# Patient Record
Sex: FEMALE | Race: WHITE | Employment: UNEMPLOYED | RURAL
[De-identification: names, ages, dates, MRNs, and addresses within clinical notes are randomized per-mention and may not be internally consistent; named-entity substitution may affect disease eponyms.]

---

## 2019-06-20 ENCOUNTER — OFFICE VISIT (OUTPATIENT)
Dept: FAMILY MEDICINE CLINIC | Age: 59
End: 2019-06-20

## 2019-06-20 VITALS
WEIGHT: 179 LBS | DIASTOLIC BLOOD PRESSURE: 74 MMHG | OXYGEN SATURATION: 96 % | TEMPERATURE: 98 F | HEIGHT: 68 IN | BODY MASS INDEX: 27.13 KG/M2 | RESPIRATION RATE: 20 BRPM | HEART RATE: 103 BPM | SYSTOLIC BLOOD PRESSURE: 124 MMHG

## 2019-06-20 DIAGNOSIS — Z78.0 POST-MENOPAUSE: ICD-10-CM

## 2019-06-20 DIAGNOSIS — Z92.21 S/P CHEMOTHERAPY, TIME SINCE GREATER THAN 12 WEEKS: ICD-10-CM

## 2019-06-20 DIAGNOSIS — R53.81 MALAISE AND FATIGUE: Primary | ICD-10-CM

## 2019-06-20 DIAGNOSIS — E55.9 VITAMIN D DEFICIENCY: ICD-10-CM

## 2019-06-20 DIAGNOSIS — Z13.220 SCREENING FOR HYPERLIPIDEMIA: ICD-10-CM

## 2019-06-20 DIAGNOSIS — Z13.820 SCREENING FOR OSTEOPOROSIS: ICD-10-CM

## 2019-06-20 DIAGNOSIS — R53.83 MALAISE AND FATIGUE: Primary | ICD-10-CM

## 2019-06-20 DIAGNOSIS — Z76.89 ENCOUNTER TO ESTABLISH CARE WITH NEW DOCTOR: ICD-10-CM

## 2019-06-20 PROBLEM — E78.00 HYPERCHOLESTEREMIA: Status: ACTIVE | Noted: 2018-09-20

## 2019-06-20 PROBLEM — E78.01 FAMILIAL HYPERCHOLESTEROLEMIA: Status: ACTIVE | Noted: 2017-08-31

## 2019-06-20 PROBLEM — C50.811 MALIGNANT NEOPLASM OF OVERLAPPING SITES OF RIGHT BREAST IN FEMALE, ESTROGEN RECEPTOR NEGATIVE (HCC): Status: ACTIVE | Noted: 2018-09-28

## 2019-06-20 PROBLEM — J30.89 PERENNIAL ALLERGIC RHINITIS: Status: ACTIVE | Noted: 2017-07-26

## 2019-06-20 PROBLEM — Z17.1 MALIGNANT NEOPLASM OF OVERLAPPING SITES OF RIGHT BREAST IN FEMALE, ESTROGEN RECEPTOR NEGATIVE (HCC): Status: ACTIVE | Noted: 2018-09-28

## 2019-06-20 PROBLEM — R73.03 PREDIABETES: Status: ACTIVE | Noted: 2017-08-31

## 2019-06-20 PROBLEM — Z80.8 FAMILY HISTORY OF SKIN CANCER: Status: ACTIVE | Noted: 2017-05-15

## 2019-06-20 PROBLEM — J30.81 ALLERGIC RHINITIS DUE TO ANIMAL HAIR AND DANDER: Status: ACTIVE | Noted: 2017-07-26

## 2019-06-20 PROBLEM — E03.8 SUBCLINICAL HYPOTHYROIDISM: Status: ACTIVE | Noted: 2017-08-31

## 2019-06-20 PROBLEM — I10 ESSENTIAL HYPERTENSION: Status: ACTIVE | Noted: 2018-09-20

## 2019-06-20 PROBLEM — K31.89 DUODENAL MASS: Status: ACTIVE | Noted: 2017-05-15

## 2019-06-20 PROBLEM — Z80.0 FAMILY HISTORY OF COLON CANCER: Status: ACTIVE | Noted: 2017-05-15

## 2019-06-20 PROBLEM — K21.9 GASTROESOPHAGEAL REFLUX DISEASE: Status: ACTIVE | Noted: 2017-11-07

## 2019-06-20 RX ORDER — MONTELUKAST SODIUM 10 MG/1
10 TABLET ORAL DAILY
COMMUNITY
End: 2019-12-05 | Stop reason: ALTCHOICE

## 2019-06-20 RX ORDER — SPIRONOLACTONE 50 MG/1
50 TABLET, FILM COATED ORAL DAILY
Qty: 30 TAB | Refills: 11 | Status: SHIPPED | OUTPATIENT
Start: 2019-06-20 | End: 2019-07-20

## 2019-06-20 RX ORDER — MULTIVITAMIN
TABLET ORAL
COMMUNITY

## 2019-06-20 RX ORDER — SPIRONOLACTONE 50 MG/1
TABLET, FILM COATED ORAL DAILY
COMMUNITY
End: 2019-06-20 | Stop reason: SDUPTHER

## 2019-06-20 RX ORDER — AZELASTINE 1 MG/ML
1 SPRAY, METERED NASAL 2 TIMES DAILY
COMMUNITY
End: 2019-06-20 | Stop reason: SDUPTHER

## 2019-06-20 RX ORDER — FLUTICASONE PROPIONATE 50 MCG
1 SPRAY, SUSPENSION (ML) NASAL DAILY
COMMUNITY

## 2019-06-20 RX ORDER — AZELASTINE 1 MG/ML
1 SPRAY, METERED NASAL 2 TIMES DAILY
Qty: 1 BOTTLE | Refills: 11 | Status: SHIPPED | OUTPATIENT
Start: 2019-06-20 | End: 2019-12-05 | Stop reason: ALTCHOICE

## 2019-06-20 RX ORDER — DEXLANSOPRAZOLE 30 MG/1
CAPSULE, DELAYED RELEASE ORAL DAILY
COMMUNITY
End: 2019-12-05 | Stop reason: ALTCHOICE

## 2019-06-20 NOTE — PROGRESS NOTES
Chief Complaint:  Chief Complaint   Patient presents with    New Patient     Establish new PCP      HISTORY OF PRESENT ILLNESS:   59F who is new to [facility]. She is here to establish care. Recently moved from [place] 3-weeks ago to [place].  is retiring here. She is a retired nurse. She was diagnosed with triple negative R-breast cancer in 2018. She has completed chemo, radiation and lumpectomy. She has already scheduled to see a breast specialist for continuity of care - Dr. Stephanie Dumont - through [address] [place] [address]. Her appointment is July 10, 2019. She will need to return for fasting labs. She has otherwise been doing well and no other major medical problems. 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    Essential hypertension I10    Familial hypercholesterolemia E78.01    Family history of colon cancer Z80.0    Family history of skin cancer Z80.8    Gastroesophageal reflux disease K21.9    Hypercholesteremia E78.00    Malignant neoplasm of overlapping sites of right breast in female, estrogen receptor negative ([facility] [address] C50.811, Z17.1    Perennial allergic rhinitis J30.89    Prediabetes R73.03    Subclinical hypothyroidism E03.9       PAST SURGICAL HISTORY:  Past Surgical History:   Procedure Laterality Date    BREAST SURGERY PROCEDURE UNLISTED      HX APPENDECTOMY      HX ORTHOPAEDIC         MEDICATIONS:  Current Outpatient Medications   Medication Sig Dispense Refill    dexlansoprazole (DEXILANT) 30 mg capsule Take  by mouth daily.  azelastine (ASTELIN) 137 mcg (0.1 %) nasal spray 1 Spray two (2) times a day. Use in each nostril as directed      fluticasone propionate (FLONASE ALLERGY RELIEF) 50 mcg/actuation nasal spray 1 [place] by Both Nostrils route daily.  montelukast (SINGULAIR) 10 mg tablet Take 10 mg by mouth daily.       spironolactone (ALDACTONE) 50 mg tablet Take  by mouth daily. ALLERGIES:  Allergies   Allergen Reactions    Tape [Adhesive] Rash        SOCIAL HISTORY:   59F . She 2-adult children. Recently moved from [place] 3-weeks ago to [place].  is retiring here. She is a retired nurse. Trained and worked in [employer] and [place]. Originally from [place]. Lived in [place] 3 years and prior to that [place]. She was diagnosed with triple negative breast cancer in September 2018. She has completed chemo, radiation and lumpectomy. Genetic cancer was negative. She will be following up with Dr. Marie Carrillo at [facility]. Her appointment will be July 10, 2019. She smoked in her youth. Stopped 25 years ago. Drinks wine 3-4 x per week. FAMILY HISTORY:   Family History   Problem Relation Age of Onset    Cancer (Colon, uterine) Mother     Hypertension Mother     Diabetes Mother     Stroke Mother     Elevated Lipids Mother     Cancer (Epital (?) cancer of the ear). Father     Stroke Father     Heart Disease Father        REVIEW OF SYSTEMS:  Review of Systems - Negative except for documented in the HPI. PHYSICAL EXAMINATION:  /74 (BP 1 Location: Left arm, BP Patient Position: Sitting)   Pulse (!) 103   Temp 98 °F (36.7 °C) (Oral)   Resp 20   Ht 5' 8\" (1.727 m)   Wt 179 lb (81.2 kg)   SpO2 96%   BMI 27.22 kg/m²     General:  Alert, cooperative, no distress. Head:  Normocephalic, without obvious abnormality, atraumatic. Eyes:  Conjunctivae/corneas clear. Pupils equal, round, reactive to light. Extraocular movements intact. Lungs:   Clear to auscultation bilaterally. Chest wall:  No tenderness or deformity. Heart:  Regular rate and rhythm, S1, S2 normal, no murmur, click, rub, or gallop. Abdomen:   Soft, non-tender. Bowel sounds normal. No masses. No organomegaly. Extremities: Extremities normal, atraumatic, no cyanosis or edema. Pulses: 2+ and symmetric all extremities.    Skin: Skin color, texture, turgor normal. No rashes or lesions. Lymph nodes: Cervical, supraclavicular, and axillary nodes normal.   Neurologic: CNII-XII intact. Normal strength, sensation, and reflexes throughout. LABORATORY DATA:  Pending/Ordered      IMPRESSION AND PLAN:   59F who is [facility]. She is here to establish care and  s/p treatment for right breast cancer with neoadjuvant AC-T, lumpectomy, SLN bx, and RT. Overall, she is recovering well with resolving fatigue and mild neuropathy. Her skin is healing well after RT. She is motivated to exercise and lose weight. Also, she recently moved from [place] to [place] and has made an appointment with oncology/breast - Dr. Jason Mckinney. She will return for pending fasting labs. Diagnoses and all orders for this visit:    1. Malaise and fatigue  -     CBC WITH AUTOMATED DIFF    2. Vitamin D deficiency  -     VITAMIN D, 25 HYDROXY    3. Screening for hyperlipidemia  -     METABOLIC PANEL, COMPREHENSIVE  -     LIPID PANEL    4. Encounter to establish care with new doctor  -     THYROID CASCADE PROFILE    5. Screening for osteoporosis  -     DEXA BONE DENSITY STUDY AXIAL; Future    6. S/P chemotherapy, time since greater than 12 weeks  -     DEXA BONE DENSITY STUDY AXIAL; Future    7. Post-menopause  -     DEXA BONE DENSITY STUDY AXIAL; Future    Other orders  -     azelastine (ASTELIN) 137 mcg (0.1 %) nasal spray; 1 [place] by Both Nostrils route two (2) times a day. Use in each nostril as directed  -     spironolactone (ALDACTONE) 50 mg tablet; Take 1 Tab by mouth daily for 30 days. I have discussed the diagnosis with the patient and the intended treatment plan as seen in the above orders. The patient has received an after-visit summary and questions were answered concerning future plans. Asked to return should symptoms worsen or not improve with treatment. Any pending labs and studies will be relayed to patient when they become available.      Pt verbalizes understanding of plan of care and denies further questions or concerns at this time. Follow-up and Dispositions    · Return if symptoms worsen or fail to improve.        Azar Ibanez MD

## 2019-06-28 ENCOUNTER — OFFICE VISIT (OUTPATIENT)
Dept: FAMILY MEDICINE CLINIC | Age: 59
End: 2019-06-28

## 2019-06-28 VITALS
TEMPERATURE: 98.3 F | SYSTOLIC BLOOD PRESSURE: 120 MMHG | WEIGHT: 186 LBS | HEIGHT: 68 IN | HEART RATE: 80 BPM | RESPIRATION RATE: 16 BRPM | OXYGEN SATURATION: 97 % | BODY MASS INDEX: 28.19 KG/M2 | DIASTOLIC BLOOD PRESSURE: 84 MMHG

## 2019-06-28 DIAGNOSIS — R00.2 PALPITATIONS: Primary | ICD-10-CM

## 2019-06-28 RX ORDER — MINERAL OIL
ENEMA (ML) RECTAL DAILY
COMMUNITY
End: 2019-12-05 | Stop reason: ALTCHOICE

## 2019-06-28 RX ORDER — ESTRADIOL 0.1 MG/G
2 CREAM VAGINAL DAILY
COMMUNITY

## 2019-06-28 NOTE — PROGRESS NOTES
HISTORY OF PRESENT ILLNESS  Nina Wylie is a 60 y.o. female. HPI   Pt presents with \"heart palpitations\"  Pt states that 3 days ago, 6/25, she felt like her heart was skipping beats. The next day, she noted that her heart felt like it was racing, and also skipping beats. This sensation has continued. She does not have chest pain, but feels like she can't get a deep breath  No shortness of breath  No edema  This has never occurred before  No recent change in medications    Of note, patient has gone through chemo and radiation, and is wondering if this could correlate? They also recently moved, and she is wondering if she is possibly just overdoing it? Review of Systems   Constitutional: Negative for fever. HENT: Negative for congestion. Cardiovascular: Positive for palpitations. Physical Exam   Constitutional: She is oriented to person, place, and time. She appears well-developed and well-nourished. HENT:   Head: Normocephalic and atraumatic. Cardiovascular: Normal rate, regular rhythm and normal heart sounds. Pulmonary/Chest: Effort normal and breath sounds normal.   Neurological: She is alert and oriented to person, place, and time. Skin: Skin is warm and dry. Psychiatric: She has a normal mood and affect. Her behavior is normal.       ASSESSMENT and PLAN    ICD-10-CM ICD-9-CM    1. Palpitations R00.2 785.1 AMB POC EKG ROUTINE W/ 12 LEADS, INTER & REP      REFERRAL TO CARDIOLOGY     Will send to cardiology for full eval, based on symptoms and PAC's  Educated about calling for appointment today  Educated about ALWAYS calling 911 or going to the ER with ANY shortness of breath, chest pain, etc.    Pt informed to return to office with worsening of symptoms, or PRN with any questions or concerns. Pt verbalizes understanding of plan of care and denies further questions or concerns at this time.

## 2019-06-28 NOTE — PATIENT INSTRUCTIONS
Palpitations: Care Instructions  Your Care Instructions    Heart palpitations are the uncomfortable sensation that your heart is beating fast or irregularly. You might feel pounding or fluttering in your chest. It might feel like your heart is skipping a beat. Although palpitations may be caused by a heart problem, they also occur because of stress, fatigue, or use of alcohol, caffeine, or nicotine. Many medicines, including diet pills, antihistamines, decongestants, and some herbal products, can cause heart palpitations. Nearly everyone has palpitations from time to time. Depending on your symptoms, your doctor may need to do more tests to try to find the cause of your palpitations. Follow-up care is a key part of your treatment and safety. Be sure to make and go to all appointments, and call your doctor if you are having problems. It's also a good idea to know your test results and keep a list of the medicines you take. How can you care for yourself at home? · Avoid caffeine, nicotine, and excess alcohol. · Do not take illegal drugs, such as methamphetamines and cocaine. · Do not take weight loss or diet medicines unless you talk with your doctor first.  · Get plenty of sleep. · Do not overeat. · If you have palpitations again, take deep breaths and try to relax. This may slow a racing heart. · If you start to feel lightheaded, lie down to avoid injuries that might result if you pass out and fall down. · Keep a record of your palpitations and bring it to your next doctor's appointment. Write down:  ? The date and time. ? Your pulse. (If your heart is beating fast, it may be hard to count your pulse.)  ? What you were doing when the palpitations started. ? How long the palpitations lasted. ? Any other symptoms. · If an activity causes palpitations, slow down or stop. Talk to your doctor before you do that activity again. · Take your medicines exactly as prescribed.  Call your doctor if you think you are having a problem with your medicine. When should you call for help? Call 911 anytime you think you may need emergency care. For example, call if:    · You passed out (lost consciousness).     · You have symptoms of a heart attack. These may include:  ? Chest pain or pressure, or a strange feeling in the chest.  ? Sweating. ? Shortness of breath. ? Pain, pressure, or a strange feeling in the back, neck, jaw, or upper belly or in one or both shoulders or arms. ? Lightheadedness or sudden weakness. ? A fast or irregular heartbeat. After you call 911, the  may tell you to chew 1 adult-strength or 2 to 4 low-dose aspirin. Wait for an ambulance. Do not try to drive yourself.     · You have symptoms of a stroke. These may include:  ? Sudden numbness, tingling, weakness, or loss of movement in your face, arm, or leg, especially on only one side of your body. ? Sudden vision changes. ? Sudden trouble speaking. ? Sudden confusion or trouble understanding simple statements. ? Sudden problems with walking or balance. ? A sudden, severe headache that is different from past headaches.    Call your doctor now or seek immediate medical care if:    · You have heart palpitations and:  ? Are dizzy or lightheaded, or you feel like you may faint. ? Have new or increased shortness of breath.    Watch closely for changes in your health, and be sure to contact your doctor if:    · You continue to have heart palpitations. Where can you learn more? Go to http://rema-elias.info/. Enter R508 in the search box to learn more about \"Palpitations: Care Instructions. \"  Current as of: July 22, 2018  Content Version: 11.9  © 5362-3895 [employer]. Care instructions adapted under license by [employer] (which disclaims liability or warranty for this information).  If you have questions about a medical condition or this instruction, always ask your healthcare professional. [employer] disclaims any warranty or liability for your use of this information.

## 2019-06-29 LAB
25(OH)D3+25(OH)D2 SERPL-MCNC: 29.5 NG/ML (ref 30–100)
ALBUMIN SERPL-MCNC: 4.3 G/DL (ref 3.6–4.8)
ALBUMIN/GLOB SERPL: 1.7 {RATIO} (ref 1.2–2.2)
ALP SERPL-CCNC: 82 IU/L (ref 39–117)
ALT SERPL-CCNC: 27 IU/L (ref 0–32)
AST SERPL-CCNC: 27 IU/L (ref 0–40)
BASOPHILS # BLD AUTO: 0 X10E3/UL (ref 0–0.2)
BASOPHILS NFR BLD AUTO: 0 %
BILIRUB SERPL-MCNC: 0.4 MG/DL (ref 0–1.2)
BUN SERPL-MCNC: 11 MG/DL (ref 8–27)
BUN/CREAT SERPL: 14 (ref 12–28)
CALCIUM SERPL-MCNC: 9.3 MG/DL (ref 8.7–10.3)
CHLORIDE SERPL-SCNC: 106 MMOL/L (ref 96–106)
CHOLEST SERPL-MCNC: 212 MG/DL (ref 100–199)
CO2 SERPL-SCNC: 27 MMOL/L (ref 20–29)
CREAT SERPL-MCNC: 0.8 MG/DL (ref 0.57–1)
EOSINOPHIL # BLD AUTO: 0.1 X10E3/UL (ref 0–0.4)
EOSINOPHIL NFR BLD AUTO: 2 %
ERYTHROCYTE [DISTWIDTH] IN BLOOD BY AUTOMATED COUNT: 14.5 % (ref 12.3–15.4)
GLOBULIN SER CALC-MCNC: 2.6 G/DL (ref 1.5–4.5)
GLUCOSE SERPL-MCNC: 117 MG/DL (ref 65–99)
HCT VFR BLD AUTO: 43 % (ref 34–46.6)
HDLC SERPL-MCNC: 47 MG/DL
HGB BLD-MCNC: 13.9 G/DL (ref 11.1–15.9)
IMM GRANULOCYTES # BLD AUTO: 0 X10E3/UL (ref 0–0.1)
IMM GRANULOCYTES NFR BLD AUTO: 0 %
INTERPRETATION, 910389: NORMAL
INTERPRETIVE COMMENT, 330017: NORMAL
LDLC SERPL CALC-MCNC: 138 MG/DL (ref 0–99)
LYMPHOCYTES # BLD AUTO: 1.5 X10E3/UL (ref 0.7–3.1)
LYMPHOCYTES NFR BLD AUTO: 31 %
MCH RBC QN AUTO: 29.9 PG (ref 26.6–33)
MCHC RBC AUTO-ENTMCNC: 32.3 G/DL (ref 31.5–35.7)
MCV RBC AUTO: 93 FL (ref 79–97)
MONOCYTES # BLD AUTO: 0.4 X10E3/UL (ref 0.1–0.9)
MONOCYTES NFR BLD AUTO: 7 %
NEUTROPHILS # BLD AUTO: 2.9 X10E3/UL (ref 1.4–7)
NEUTROPHILS NFR BLD AUTO: 60 %
PLATELET # BLD AUTO: 223 X10E3/UL (ref 150–450)
POTASSIUM SERPL-SCNC: 4.5 MMOL/L (ref 3.5–5.2)
PROT SERPL-MCNC: 6.9 G/DL (ref 6–8.5)
RBC # BLD AUTO: 4.65 X10E6/UL (ref 3.77–5.28)
SODIUM SERPL-SCNC: 144 MMOL/L (ref 134–144)
T4 FREE SERPL-MCNC: 1.06 NG/DL (ref 0.82–1.77)
THYROPEROXIDASE AB SERPL-ACNC: 14 IU/ML (ref 0–34)
TRIGL SERPL-MCNC: 134 MG/DL (ref 0–149)
TSH SERPL DL<=0.005 MIU/L-ACNC: 4.59 UIU/ML (ref 0.45–4.5)
VLDLC SERPL CALC-MCNC: 27 MG/DL (ref 5–40)
WBC # BLD AUTO: 4.9 X10E3/UL (ref 3.4–10.8)

## 2019-07-05 ENCOUNTER — OFFICE VISIT (OUTPATIENT)
Dept: CARDIOLOGY CLINIC | Age: 59
End: 2019-07-05

## 2019-07-05 VITALS
SYSTOLIC BLOOD PRESSURE: 128 MMHG | HEIGHT: 68 IN | BODY MASS INDEX: 27.65 KG/M2 | RESPIRATION RATE: 16 BRPM | HEART RATE: 95 BPM | DIASTOLIC BLOOD PRESSURE: 82 MMHG | WEIGHT: 182.4 LBS | OXYGEN SATURATION: 96 %

## 2019-07-05 DIAGNOSIS — R00.2 PALPITATIONS: Primary | ICD-10-CM

## 2019-07-05 DIAGNOSIS — Z85.3 HISTORY OF BREAST CANCER: ICD-10-CM

## 2019-07-05 NOTE — PROGRESS NOTES
Krishna Mendez is a 60 y.o. female    Chief Complaint   Patient presents with    New Patient     Ref by Dr. Susan Oliveros Palpitations       Chest pain No  SOB Pt states having SOB with exertion. Dizziness Pt states she gets dizzy randomly and it sometimes happens when she turns too fast.   Swelling NO  Recent hospital visit NO  Refills NO    Visit Vitals  /82 (BP 1 Location: Left arm, BP Patient Position: Sitting)   Pulse 95   Resp 16   Ht 5' 8\" (1.727 m)   Wt 182 lb 6.4 oz (82.7 kg)   SpO2 96%   BMI 27.73 kg/m²       1. Have you been to the ER, urgent care clinic since your last visit? Hospitalized since your last visit? NO    2. Have you seen or consulted any other health care providers outside of the [address] [place] [address] since your last visit? Include any pap smears or colon screening.   NO

## 2019-07-05 NOTE — PROGRESS NOTES
Christina Iniguez MD    [id number] Merged with [facility] [place], [address] [facility]    Office [phone],M [phone]  Pager [phone] Nitin Escobar is a 60 y.o. female referred for evaluation of palpitations. Consult requested by Lisa Arvizu MD      Primary care physician:  Lisa Arvizu MD      Chief complaint:  Mary Lou Mitchell is a 60 y.o. female who complains of   Chief Complaint   Patient presents with   [facility] Patient     Ref by Dr. Purnima Juarez     Dear Ms Merlyn Thorne,    I had the pleasure of seeing Ms Mary Lou Mitchell in the office today. Assessment:  Palpitations  Breast CA - Right ( s/p lumpectomy/radiation/chemo)        Plan:     ECHO  E cardio event monitor. Follow-up after cardiac work-up. Aggressive cardiovascular risk factor modification. Patient understands the plan. All questions were answered to the patient's satisfaction. Medication Side Effects and Warnings were discussed with patient: yes  Patient Labs were reviewed and or requested:  yes  Patient Past Records were reviewed and or requested: yes    I appreciate the opportunity to be involved in Ms. Mckeon. Please see note below for details. Please do not hesitate to contact us with questions or concerns. Christina Iniguez MD    Cardiac Testing/ Procedures: A. Cardiac Cath/PCI:    B.ECHO/DRE: 10/24/18 - EF 55%; Mild LVH; Asc aorta midly dilated 4.1 cm    C. StressNuclear/Stress ECHO/Stress test:    D.Vascular:    E. EP:    F. Miscellaneous: 10/4/18 CT chest/abd/pelvis -performed for evaluation of breast cancer. No mention of the ureter but states that no pleural/pericardial effusions, heart size normal.    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 History of triple negative R-breast cancer in 2018.  She has completed chemo, radiation and lumpectomy followed by Dr. Bianca Camarena - through [address] [place] [address]. History of intermittent palpitations in the last 1 to 2 weeks. Occasional dizziness. No syncope. Blood pressure was not low. Heart rate in the 100s to 120s with irregularity. No history of chest pain/dyspnea/diaphoresis/swelling lower extremities    Originally from [facility]          Past Medical History:   Diagnosis Date    Hypertension       Past Surgical History:   Procedure Laterality Date    BREAST SURGERY PROCEDURE UNLISTED      HX APPENDECTOMY      HX ORTHOPAEDIC       Family History   Problem Relation Age of Onset    Cancer Mother     Hypertension Mother     Diabetes Mother     Stroke Mother     Elevated Lipids Mother     Cancer Father     Stroke Father     Heart Disease Father       Social History     Tobacco Use    Smoking status: Former Smoker    Smokeless tobacco: Never Used   Substance Use Topics    Alcohol use: Yes     Alcohol/week: 2.4 oz     Types: 4 Glasses of wine per week    Drug use: Not Currently     Types: Marijuana     Comment: Used x 1 during chemo          Medications before admission:    Current Outpatient Medications   Medication Sig Dispense    estradiol (ESTRACE) 0.01 % (0.1 mg/gram) vaginal cream Insert 2 g into vagina daily. Use 1/2 gram once a week     fexofenadine (ALLEGRA) 180 mg tablet Take  by mouth daily.  dexlansoprazole (DEXILANT) 30 mg capsule Take  by mouth daily.  fluticasone propionate (FLONASE ALLERGY RELIEF) 50 mcg/actuation nasal spray 1 [place] by Both Nostrils route daily.  montelukast (SINGULAIR) 10 mg tablet Take 10 mg by mouth daily.  azelastine (ASTELIN) 137 mcg (0.1 %) nasal spray 1 [place] by Both Nostrils route two (2) times a day. Use in each nostril as directed 1 Bottle    spironolactone (ALDACTONE) 50 mg tablet Take 1 Tab by mouth daily for 30 days. 30 Tab    calcium-cholecalciferol, D3, (CALTRATE 600+D) tablet Take  by mouth. No current facility-administered medications for this visit. Review of Systems:  (bold if positive, if negative)    Gen:  Eyes:  ENT:  CVS: Pulm:  GI:  Abdominal pain,  :    MS:  Skin:  Psych:  Endo:    Hem:  Renal:    Neuro:        Physical Exam:  Visit Vitals  /82 (BP 1 Location: Left arm, BP Patient Position: Sitting)   Pulse 95   Resp 16   Ht 5' 8\" (1.727 m)   Wt 182 lb 6.4 oz (82.7 kg)   SpO2 96%   BMI 27.73 kg/m²          Gen: Well-developed, well-nourished, in no acute distress  HEENT:  Pink conjunctivae, hearing intact to voice, moist mucous membranes  Neck: Supple,No JVD, No Carotid Bruit, Thyroid- non tender  Resp: No accessory muscle use, Clear breath sounds, No rales or rhonchi  Card: Regular Rate,Rythm,Normal S1, S2, No murmurs, rubs or gallop. No thrills. Abd:  Soft, non-tender, non-distended, normoactive bowel sounds are present,   MSK: No cyanosis or clubbing  Skin: No rashes or ulcers  Neuro: moving all four extremities, no focal deficit, follows commands appropriately  Psych:  Good insight, oriented to person, place and time, alert, Nml Affect  LE: No edema  Vascular: Radial pulses 2+ and symmetric        EKG: Sinus rhythm RSR'-V1      Cxray:    LABS:    No results for input(s): CPK, TROIQ in the last 72 hours.     No lab exists for component: CKQMB, CPKMB    Lab Results   Component Value Date/Time    WBC 4.9 06/28/2019 08:21 AM    HGB 13.9 06/28/2019 08:21 AM    HCT 43.0 06/28/2019 08:21 AM    PLATELET 177 68/40/2577 08:21 AM    MCV 93 06/28/2019 08:21 AM     Lab Results   Component Value Date/Time    Sodium 144 06/28/2019 08:21 AM    Potassium 4.5 06/28/2019 08:21 AM    Chloride 106 06/28/2019 08:21 AM    CO2 27 06/28/2019 08:21 AM    Glucose 117 (H) 06/28/2019 08:21 AM    BUN 11 06/28/2019 08:21 AM    Creatinine 0.80 06/28/2019 08:21 AM    BUN/Creatinine ratio 14 06/28/2019 08:21 AM    GFR est AA 93 06/28/2019 08:21 AM    GFR est non-AA 80 06/28/2019 08:21 AM    Calcium 9.3 06/28/2019 08:21 AM             Abdelrahman Vargas MD

## 2019-07-11 ENCOUNTER — TELEPHONE (OUTPATIENT)
Dept: CARDIOLOGY CLINIC | Age: 59
End: 2019-07-11

## 2019-07-11 NOTE — TELEPHONE ENCOUNTER
Patient needs an E cardio event monitor per Dr Zulema Cho office visit notes on 7/5/19 for Dx Palpitations.

## 2019-07-12 ENCOUNTER — CLINICAL SUPPORT (OUTPATIENT)
Dept: CARDIOLOGY CLINIC | Age: 59
End: 2019-07-12

## 2019-07-12 DIAGNOSIS — R00.2 PALPITATIONS: ICD-10-CM

## 2019-07-15 ENCOUNTER — TELEPHONE (OUTPATIENT)
Dept: CARDIOLOGY CLINIC | Age: 59
End: 2019-07-15

## 2019-07-15 ENCOUNTER — TELEPHONE (OUTPATIENT)
Dept: FAMILY MEDICINE CLINIC | Age: 59
End: 2019-07-15

## 2019-07-15 NOTE — TELEPHONE ENCOUNTER
Spoke with patient who states she has several tests scheduled by the cardiologist coming up soon. Advised patient to continue to follow up with Cardiology and unless there is an acute problem to schedule a follow up appointment in 3-4 months. Voiced agreement and appreciation for the call.

## 2019-07-23 ENCOUNTER — TELEPHONE (OUTPATIENT)
Dept: CARDIOLOGY CLINIC | Age: 59
End: 2019-07-23

## 2019-07-23 NOTE — TELEPHONE ENCOUNTER
Called patient reviewed below results verbalized understanding.      ----- Message from Margit Lombard, MD sent at 7/23/2019  5:23 PM EDT -----  ECHO - Nml EF/pump function.  Valves normal

## 2019-08-05 ENCOUNTER — HOSPITAL ENCOUNTER (OUTPATIENT)
Dept: MAMMOGRAPHY | Age: 59
Discharge: HOME | End: 2019-08-05
Attending: INTERNAL MEDICINE
Payer: COMMERCIAL

## 2019-08-05 DIAGNOSIS — Z92.21 S/P CHEMOTHERAPY, TIME SINCE GREATER THAN 12 WEEKS: ICD-10-CM

## 2019-08-05 DIAGNOSIS — Z13.820 SCREENING FOR OSTEOPOROSIS: ICD-10-CM

## 2019-08-05 DIAGNOSIS — Z78.0 POST-MENOPAUSE: ICD-10-CM

## 2019-08-05 PROCEDURE — 77080 DXA BONE DENSITY AXIAL: CPT

## 2019-09-12 ENCOUNTER — OFFICE VISIT (OUTPATIENT)
Dept: CARDIOLOGY CLINIC | Age: 59
End: 2019-09-12

## 2019-09-12 VITALS
BODY MASS INDEX: 27.1 KG/M2 | DIASTOLIC BLOOD PRESSURE: 82 MMHG | HEART RATE: 94 BPM | WEIGHT: 178.8 LBS | RESPIRATION RATE: 16 BRPM | SYSTOLIC BLOOD PRESSURE: 122 MMHG | OXYGEN SATURATION: 96 % | HEIGHT: 68 IN

## 2019-09-12 DIAGNOSIS — I47.29 NSVT (NONSUSTAINED VENTRICULAR TACHYCARDIA): Primary | ICD-10-CM

## 2019-09-12 DIAGNOSIS — R00.2 PALPITATIONS: ICD-10-CM

## 2019-09-12 DIAGNOSIS — Z85.3 HISTORY OF BREAST CANCER: ICD-10-CM

## 2019-09-12 RX ORDER — MELATONIN
DAILY
COMMUNITY

## 2019-09-12 RX ORDER — DIPHENHYDRAMINE HCL 25 MG
50 CAPSULE ORAL
COMMUNITY

## 2019-09-12 RX ORDER — RANITIDINE 150 MG/1
150 TABLET, FILM COATED ORAL 2 TIMES DAILY
COMMUNITY
End: 2019-12-05 | Stop reason: ALTCHOICE

## 2019-09-12 RX ORDER — SPIRONOLACTONE 25 MG/1
25 TABLET ORAL
COMMUNITY

## 2019-09-12 RX ORDER — LANOLIN ALCOHOL/MO/W.PET/CERES
400 CREAM (GRAM) TOPICAL DAILY
COMMUNITY

## 2019-09-12 RX ORDER — CARVEDILOL 3.12 MG/1
3.12 TABLET ORAL 2 TIMES DAILY
Qty: 60 TAB | Refills: 3 | Status: SHIPPED | OUTPATIENT
Start: 2019-09-12 | End: 2019-12-30

## 2019-09-12 NOTE — PROGRESS NOTES
Vito Sainz is a 60 y.o. female    Chief Complaint   Patient presents with    Follow-up     6 wk f/u     Palpitations     Patient states having palpitations daily. Chest pain NO    SOB NO    Dizziness NO    Swelling NO    Refills NO    Visit Vitals  /82 (BP 1 Location: Left arm, BP Patient Position: Sitting)   Pulse 94   Resp 16   Ht 5' 8\" (1.727 m)   Wt 178 lb 12.8 oz (81.1 kg)   SpO2 96%   BMI 27.19 kg/m²       1. Have you been to the ER, urgent care clinic since your last visit? Hospitalized since your last visit? No    2. Have you seen or consulted any other health care providers outside of the [address] [place] [address] since your last visit? Include any pap smears or colon screening.  No

## 2019-09-12 NOTE — PROGRESS NOTES
Get Rodgers MD    [id number] [place], [address] [facility]    Office [phone],[facility] [phone]  Pager [phone] Carole Devine is a 60 y.o. female is here for f/u visit. 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    Essential hypertension I10    Familial hypercholesterolemia E78.01    Family history of colon cancer Z80.0    Family history of skin cancer Z80.8    Gastroesophageal reflux disease K21.9    Hypercholesteremia E78.00    Malignant neoplasm of overlapping sites of right breast in female, estrogen receptor negative ([facility] C50.811, Z17.1    Perennial allergic rhinitis J30.89    Prediabetes R73.03    Subclinical hypothyroidism E03.9       Primary care physician:  Kristen Gamboa MD    Dear Ms Lindsay Caceres,    I had the pleasure of seeing Ms Deepak Sykes in the office today. Assessment:    Palpitations -E cardio event monitor PVCs. One run of 7 beat NSVT. Breast CA - Right ( s/p lumpectomy/radiation/chemo)  Allergies-was on multiple allergy medications but since she felt that it worsened her palpitations she now takes Benadryl which has improved her symptoms. Plan:     Stress ECHO - hold coreg 24 hour prior to the test  Coreg 3.125 mg twice daily. She currently takes spironolactone 50 mg for swelling/edema. If her blood pressure decreases she will decrease Spironolactone to 25 mg daily. In June her TSH was mildly abnormal.  Would recommend rechecking TSH to make sure it is not on an upward trend. Aggressive cardiovascular risk factor modification. Patient understands the plan. All questions were answered to the patient's satisfaction.     Medication Side Effects and Warnings were discussed with patient: yes  Patient Labs were reviewed and or requested:  yes  Patient Past Records were reviewed and or requested: yes    I appreciate the opportunity to be involved in Ms. Mckeon. Please see note below for details. Please do not hesitate to contact us with questions or concerns. Sam Bryan MD    Cardiac Testing/ Procedures: A. Cardiac Cath/PCI:    B.ECHO/DRE:  7/22/19 - EF 61-65%/ Trace MR  10/24/18 - EF 55%; Mild LVH; Asc aorta midly dilated 4.1 cm    C. StressNuclear/Stress ECHO/Stress test:    D.Vascular:    E. EP:    F. Miscellaneous: 10/4/18 CT chest/abd/pelvis -performed for evaluation of breast cancer. No mention of the ureter but states that no pleural/pericardial effusions, heart size normal.    History of present illness:    Continues to have intermittent palpitations. No dizziness. No syncope. 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 History of  R-breast cancer in 2018. She has completed chemo, radiation and lumpectomy followed by Dr. Anup Lezama - through [address] [place] [address]. History of intermittent palpitations in the last 1 to 2 weeks. Occasional dizziness. No syncope. Blood pressure was not low. Heart rate in the 100s to 120s with irregularity. No history of chest pain/dyspnea/diaphoresis/swelling lower extremities)    Originally from [facility] (she is visiting [facility] later this month and she will do the stress echocardiogram on her return)  ROS:  (bold if positive, if negative)             Medications before admission:    Current Outpatient Medications   Medication Sig Dispense    raNITIdine (ZANTAC) 150 mg tablet Take 150 mg by mouth two (2) times a day.  diphenhydrAMINE (BENADRYL) 25 mg capsule Take 50 mg by mouth every six (6) hours as needed.  magnesium oxide (MAG-OX) 400 mg tablet Take 400 mg by mouth daily.  cholecalciferol (VITAMIN D3) 1,000 unit tablet Take  by mouth daily.  carvedilol (COREG) 3.125 mg tablet Take 1 Tab by mouth two (2) times a day.  60 Tab    spironolactone (ALDACTONE) 50 mg tablet Take 50 mg by mouth.  estradiol (ESTRACE) 0.01 % (0.1 mg/gram) vaginal cream Insert 2 g into vagina daily. Use 1/2 gram once a week     fluticasone propionate (FLONASE ALLERGY RELIEF) 50 mcg/actuation nasal spray 1 [place] by Both Nostrils route daily.  calcium-cholecalciferol, D3, (CALTRATE 600+D) tablet Take  by mouth.  fexofenadine (ALLEGRA) 180 mg tablet Take  by mouth daily.  dexlansoprazole (DEXILANT) 30 mg capsule Take  by mouth daily.  montelukast (SINGULAIR) 10 mg tablet Take 10 mg by mouth daily.  azelastine (ASTELIN) 137 mcg (0.1 %) nasal spray 1 [place] by Both Nostrils route two (2) times a day. Use in each nostril as directed (Patient not taking: Reported on 9/12/2019) 1 Bottle     No current facility-administered medications for this visit. Family History of CAD:    Yes    Social History:  Current  Smoker  No    Physical Exam:  Visit Vitals  /82 (BP 1 Location: Left arm, BP Patient Position: Sitting)   Pulse 94   Resp 16   Ht 5' 8\" (1.727 m)   Wt 178 lb 12.8 oz (81.1 kg)   SpO2 96%   BMI 27.19 kg/m²          Gen: Well-developed, well-nourished, in no acute distress  Neck: Supple,No JVD, No Carotid Bruit,   Resp: No accessory muscle use, Clear breath sounds, No rales or rhonchi  Card: Regular Rate,Rythm,Normal S1, S2, No murmurs, rubs or gallop. No thrills.    Abd:  Soft, non-tender, non-distended,BS+,   MSK: No cyanosis  Skin: No rashes    Neuro: moving all four extremities , follows commands appropriately  Psych:  Good insight, oriented to person, place , alert, Nml Affect  LE: No edema    EKG:       LABS:        Lab Results   Component Value Date/Time    WBC 4.9 06/28/2019 08:21 AM    HGB 13.9 06/28/2019 08:21 AM    HCT 43.0 06/28/2019 08:21 AM    PLATELET 398 69/71/7670 08:21 AM     Lab Results   Component Value Date/Time    Sodium 144 06/28/2019 08:21 AM    Potassium 4.5 06/28/2019 08:21 AM    Chloride 106 06/28/2019 08:21 AM    CO2 27 06/28/2019 08:21 AM    Glucose 117 (H) 06/28/2019 08:21 AM    BUN 11 06/28/2019 08:21 AM    Creatinine 0.80 06/28/2019 08:21 AM    BUN/Creatinine ratio 14 06/28/2019 08:21 AM    GFR est AA 93 06/28/2019 08:21 AM    GFR est non-AA 80 06/28/2019 08:21 AM    Calcium 9.3 06/28/2019 08:21 AM       No results found for: APTT  No results found for: INR, PTMR, PTP, PT1, PT2  No components found for: Marige Giraldo MD

## 2019-09-12 NOTE — LETTER
9/12/19 Patient: Francia Rivas YOB: 1960 Date of Visit: 9/12/2019 Maxim Lr MD 
[address] [facility] [address] [facility] [phone] VIA In Basket Dear Maxim Lr MD, Thank you for referring Ms. Nina Last to [facility] for evaluation. My notes for this consultation are attached. If you have questions, please do not hesitate to call me. I look forward to following your patient along with you. Sincerely, Jose Luis Myers MD

## 2019-10-22 ENCOUNTER — TELEPHONE (OUTPATIENT)
Dept: CARDIOLOGY CLINIC | Age: 59
End: 2019-10-22

## 2019-10-29 ENCOUNTER — OFFICE VISIT (OUTPATIENT)
Dept: CARDIOLOGY CLINIC | Age: 59
End: 2019-10-29

## 2019-10-29 VITALS
OXYGEN SATURATION: 96 % | DIASTOLIC BLOOD PRESSURE: 80 MMHG | HEART RATE: 87 BPM | BODY MASS INDEX: 26.92 KG/M2 | SYSTOLIC BLOOD PRESSURE: 112 MMHG | HEIGHT: 68 IN | WEIGHT: 177.6 LBS

## 2019-10-29 DIAGNOSIS — I47.29 NSVT (NONSUSTAINED VENTRICULAR TACHYCARDIA): ICD-10-CM

## 2019-10-29 DIAGNOSIS — R00.2 PALPITATIONS: Primary | ICD-10-CM

## 2019-10-29 RX ORDER — FAMOTIDINE 20 MG/1
20 TABLET, FILM COATED ORAL 2 TIMES DAILY
COMMUNITY

## 2019-10-29 NOTE — LETTER
10/29/19 Patient: Renato Ann YOB: 1960 Date of Visit: 10/29/2019 Kenneth Capone MD 
[address] [facility] [address] [facility] [phone] VIA In Basket Dear Kenneth Capone MD, Thank you for referring Ms. Nina Last to [facility] for evaluation. My notes for this consultation are attached. If you have questions, please do not hesitate to call me. I look forward to following your patient along with you. Sincerely, Shruthi Toure MD

## 2019-10-29 NOTE — PROGRESS NOTES
Jc Gutierrez MD    [address] [facility] [place], [address] [facility]    Office [phone],[facility] [phone]  Pager [phone] Randi Shea is a 60 y.o. female is here for f/u visit. 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    Essential hypertension I10    Familial hypercholesterolemia E78.01    Family history of colon cancer Z80.0    Family history of skin cancer Z80.8    Gastroesophageal reflux disease K21.9    Hypercholesteremia E78.00    Malignant neoplasm of overlapping sites of right breast in female, estrogen receptor negative ([facility] C50.811, Z17.1    Perennial allergic rhinitis J30.89    Prediabetes R73.03    Subclinical hypothyroidism E03.9       Primary care physician:  Federico Graff MD    Dear Ms Phillips Courser,    I had the pleasure of seeing Ms Charlette York in the office today. Assessment:    Palpitations -E cardio event monitor PVCs. One run of 7 beat NSVT. Breast CA - Right ( s/p lumpectomy/radiation/chemo)  Allergies-was on multiple allergy medications but since she felt that it worsened her palpitations she now takes Benadryl which has improved her symptoms. Plan:     Stress ECHO -10/17/2019-normal  Coreg 3.125 mg twice daily. She currently takes spironolactone 50 mg for swelling/edema. If her blood pressure decreases she will decrease Spironolactone to 25 mg daily. Avoid stimulants like chocolate/coffee  In June her TSH was mildly abnormal.  Would recommend rechecking TSH to make sure it is not on an upward trend. Aggressive cardiovascular risk factor modification. Patient understands the plan. All questions were answered to the patient's satisfaction.     Medication Side Effects and Warnings were discussed with patient: yes  Patient Labs were reviewed and or requested:  yes  Patient Past Records were reviewed and or requested: yes    I appreciate the opportunity to be involved in Ms. Mckeon. Please see note below for details. Please do not hesitate to contact us with questions or concerns. Nadine Burgess MD    Cardiac Testing/ Procedures: A. Cardiac Cath/PCI:    B.ECHO/DRE:  7/22/19 - EF 61-65%/ Trace MR  10/24/18 - EF 55%; Mild LVH; Asc aorta midly dilated 4.1 cm    C. StressNuclear/Stress ECHO/Stress test: 10/17/2019-normal stress echo; 7 minutes    D. Vascular:    E. EP:    F. Miscellaneous: 10/4/18 CT chest/abd/pelvis -performed for evaluation of breast cancer. No mention of the ureter but states that no pleural/pericardial effusions, heart size normal.    History of present illness:    Continues to have intermittent palpitations. Recently had an episode when she started eating chocolate after a few days where she started having palpitations. No dizziness. No syncope. 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 History of  R-breast cancer in 2018. She has completed chemo, radiation and lumpectomy followed by Dr. Oliver Escobar - through [facility]. History of intermittent palpitations in the last 1 to 2 weeks. Occasional dizziness. No syncope. Blood pressure was not low. Heart rate in the 100s to 120s with irregularity. No history of chest pain/dyspnea/diaphoresis/swelling lower extremities)    Originally from [facility] . 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  diphenhydrAMINE (BENADRYL) 25 mg capsule Take 50 mg by mouth every six (6) hours as needed.  magnesium oxide (MAG-OX) 400 mg tablet Take 400 mg by mouth daily.      cholecalciferol (VITAMIN D3) 1,000 unit tablet Take  by mouth daily.  carvedilol (COREG) 3.125 mg tablet Take 1 Tab by mouth two (2) times a day. 60 Tab    spironolactone (ALDACTONE) 50 mg tablet Take 50 mg by mouth.  estradiol (ESTRACE) 0.01 % (0.1 mg/gram) vaginal cream Insert 2 g into vagina daily. Use 1/2 gram once a week     fluticasone propionate (FLONASE ALLERGY RELIEF) 50 mcg/actuation nasal spray 1 [place] by Both Nostrils route daily.  calcium-cholecalciferol, D3, (CALTRATE 600+D) tablet Take  by mouth.  raNITIdine (ZANTAC) 150 mg tablet Take 150 mg by mouth two (2) times a day.  fexofenadine (ALLEGRA) 180 mg tablet Take  by mouth daily.  dexlansoprazole (DEXILANT) 30 mg capsule Take  by mouth daily.  montelukast (SINGULAIR) 10 mg tablet Take 10 mg by mouth daily.  azelastine (ASTELIN) 137 mcg (0.1 %) nasal spray 1 [place] by Both Nostrils route two (2) times a day. Use in each nostril as directed (Patient not taking: Reported on 10/29/2019) 1 Bottle     No current facility-administered medications for this visit. Family History of CAD:    Yes    Social History:  Current  Smoker  No    Physical Exam:  Visit Vitals  /80 (BP 1 Location: Left arm, BP Patient Position: Sitting)   Pulse 87   Ht 5' 8\" (1.727 m)   Wt 177 lb 9.6 oz (80.6 kg)   SpO2 96%   BMI 27.00 kg/m²          Gen: Well-developed, well-nourished, in no acute distress  Neck: Supple,No JVD, No Carotid Bruit,   Resp: No accessory muscle use, Clear breath sounds, No rales or rhonchi  Card: Regular Rate,Rythm,Normal S1, S2, No murmurs, rubs or gallop. No thrills.    Abd:  Soft, non-tender, non-distended,BS+,   MSK: No cyanosis  Skin: No rashes    Neuro: moving all four extremities , follows commands appropriately  Psych:  Good insight, oriented to person, place , alert, Nml Affect  LE: No edema    EKG:       LABS:        Lab Results   Component Value Date/Time    WBC 4.9 06/28/2019 08:21 AM    HGB 13.9 06/28/2019 08:21 AM    HCT 43.0 06/28/2019 08:21 AM PLATELET 106 73/19/2330 08:21 AM     Lab Results   Component Value Date/Time    Sodium 144 06/28/2019 08:21 AM    Potassium 4.5 06/28/2019 08:21 AM    Chloride 106 06/28/2019 08:21 AM    CO2 27 06/28/2019 08:21 AM    Glucose 117 (H) 06/28/2019 08:21 AM    BUN 11 06/28/2019 08:21 AM    Creatinine 0.80 06/28/2019 08:21 AM    BUN/Creatinine ratio 14 06/28/2019 08:21 AM    GFR est AA 93 06/28/2019 08:21 AM    GFR est non-AA 80 06/28/2019 08:21 AM    Calcium 9.3 06/28/2019 08:21 AM       No results found for: APTT  No results found for: INR, PTMR, PTP, PT1, PT2, INREXT, INREXT  No components found for: Jeanna Yee MD

## 2019-10-29 NOTE — PROGRESS NOTES
Darek Gutierrez is a 60 y.o. female    Chief Complaint   Patient presents with    Palpitations    Other     NSVT       Chest pain No    SOB No    Dizziness No    Swelling No    Refills No    Visit Vitals  /80 (BP 1 Location: Left arm, BP Patient Position: Sitting)   Pulse 87   Ht 5' 8\" (1.727 m)   Wt 177 lb 9.6 oz (80.6 kg)   SpO2 96%   BMI 27.00 kg/m²       1. Have you been to the ER, urgent care clinic since your last visit? Hospitalized since your last visit? No    2. Have you seen or consulted any other health care providers outside of the [address] [place] [address] since your last visit? Include any pap smears or colon screening.  No

## 2019-12-05 ENCOUNTER — OFFICE VISIT (OUTPATIENT)
Dept: FAMILY MEDICINE CLINIC | Age: 59
End: 2019-12-05

## 2019-12-05 VITALS
HEART RATE: 77 BPM | HEIGHT: 68 IN | TEMPERATURE: 98 F | OXYGEN SATURATION: 98 % | DIASTOLIC BLOOD PRESSURE: 80 MMHG | SYSTOLIC BLOOD PRESSURE: 120 MMHG | RESPIRATION RATE: 16 BRPM | BODY MASS INDEX: 25.61 KG/M2 | WEIGHT: 169 LBS

## 2019-12-05 DIAGNOSIS — R53.83 MALAISE AND FATIGUE: ICD-10-CM

## 2019-12-05 DIAGNOSIS — C50.911 MALIGNANT NEOPLASM OF RIGHT FEMALE BREAST, UNSPECIFIED ESTROGEN RECEPTOR STATUS, UNSPECIFIED SITE OF BREAST (HCC): ICD-10-CM

## 2019-12-05 DIAGNOSIS — R73.09 ELEVATED GLUCOSE: Primary | ICD-10-CM

## 2019-12-05 DIAGNOSIS — Z12.11 SCREENING FOR COLON CANCER: ICD-10-CM

## 2019-12-05 DIAGNOSIS — R79.89 ELEVATED TSH: ICD-10-CM

## 2019-12-05 DIAGNOSIS — Z17.1 MALIGNANT NEOPLASM OF OVERLAPPING SITES OF RIGHT BREAST IN FEMALE, ESTROGEN RECEPTOR NEGATIVE (HCC): ICD-10-CM

## 2019-12-05 DIAGNOSIS — R53.81 MALAISE AND FATIGUE: ICD-10-CM

## 2019-12-05 DIAGNOSIS — E55.9 VITAMIN D DEFICIENCY: ICD-10-CM

## 2019-12-05 DIAGNOSIS — C50.811 MALIGNANT NEOPLASM OF OVERLAPPING SITES OF RIGHT BREAST IN FEMALE, ESTROGEN RECEPTOR NEGATIVE (HCC): ICD-10-CM

## 2019-12-05 DIAGNOSIS — Z13.220 SCREENING FOR HYPERLIPIDEMIA: ICD-10-CM

## 2019-12-05 PROBLEM — C50.919 MALIGNANT NEOPLASM OF BREAST (HCC): Status: ACTIVE | Noted: 2019-07-02

## 2019-12-05 RX ORDER — BISMUTH SUBSALICYLATE 262 MG
1 TABLET,CHEWABLE ORAL
COMMUNITY
End: 2019-12-05 | Stop reason: ALTCHOICE

## 2019-12-05 NOTE — ASSESSMENT & PLAN NOTE
Stable, based on history, physical exam and review of pertinent labs, studies and medications; meds reconciled; continue current treatment plan., This condition is managed by Specialist.  Key Oncology Meds     Patient is on no Oncologic meds. Key Pain Meds     The patient is on no pain meds. Lab Results   Component Value Date/Time    WBC 4.9 06/28/2019 08:21 AM    ABS.  NEUTROPHILS 2.9 06/28/2019 08:21 AM    HGB 13.9 06/28/2019 08:21 AM    HCT 43.0 06/28/2019 08:21 AM    PLATELET 278 92/51/4043 08:21 AM    Creatinine 0.80 06/28/2019 08:21 AM    BUN 11 06/28/2019 08:21 AM    ALT (SGPT) 27 06/28/2019 08:21 AM    AST (SGOT) 27 06/28/2019 08:21 AM    Albumin 4.3 06/28/2019 08:21 AM

## 2019-12-05 NOTE — PROGRESS NOTES
Chief Complaint:  Chief Complaint   Patient presents with    Medication Evaluation    Labs       History of Present Illness:  59F with h/o right breast cancer, HTN, HLD and prediabetes who presents for follow up and review of health maintenance. She continues to follow up with her breast specialist with scheduled mammograms which have been normal. She had labs done about 6-months ago and this showed slightly elevated TSH, lipids and slightly low vitamin D. She had a bone density scan done in August and this was normal - showing no evidence of osteopenia or osteoporosis. She is here to review and needs some follow up labs as well. She is feeling better and energy is improving. She has lost some 17+ lbs since June. This has been through diet and exercise. And generally increased activity. She reports that she may be changing insurance to [employer]. It is possible that she may need to change physicians. Reviewed PmHx, RxHx, FmHx, SocHx, AllgHx and updated and dated in the chart.     Patient Active Problem List    Diagnosis    Malignant neoplasm of overlapping sites of right breast in female, estrogen receptor negative ([facility] [address]    Essential hypertension    Hypercholesteremia    Gastroesophageal reflux disease    Familial hypercholesterolemia    Prediabetes    Subclinical hypothyroidism    Allergic rhinitis due to animal hair and dander    Perennial allergic rhinitis    Duodenal mass    Family history of colon cancer    Family history of skin cancer       Review of Systems - negative except as listed above in the HPI    Objective:     Vitals:    12/05/19 1031   BP: 120/80   Pulse: 77   Resp: 16   Temp: 98 °F (36.7 °C)   TempSrc: Oral   SpO2: 98%   Weight: 169 lb (76.7 kg)   Height: 5' 8\" (1.727 m)     Physical Examination:   General appearance - alert, well appearing, and in no distress  Mental status - alert, oriented to person, place, and time  Chest - clear to auscultation, no wheezes, rales or rhonchi, symmetric air entry  Heart - normal rate, regular rhythm, normal S1, S2, no murmurs, rubs, clicks or gallops  Neurological - alert, oriented, normal speech, no focal findings or movement disorder noted  Musculoskeletal - no joint tenderness, deformity or swelling  Extremities - peripheral pulses normal, no pedal edema, no clubbing or cyanosis    Assessment/ Plan:   59F who presents for follow up and to review HM and needs repeat fasting labs. She will return for these. In general, she is doing well and we addressed the issues as outlined below. Diagnoses and all orders for this visit:    1. Elevated glucose  -     HEMOGLOBIN A1C WITH EAG; Future    2. Malaise and fatigue   Stable. Improving. 3. Elevated TSH  -     TSH 3RD GENERATION; Future  -     T4 (THYROXINE); Future    4. Vitamin D deficiency  -     VITAMIN D, 25 HYDROXY; Future    5. Malignant neoplasm of overlapping sites of right breast in female, estrogen receptor negative [facility])  Assessment & Plan: This condition is managed by Specialist.  Key Oncology Meds     Patient is on no Oncologic meds. Key Pain Meds     The patient is on no pain meds. Lab Results   Component Value Date/Time    WBC 4.9 06/28/2019 08:21 AM    ABS. NEUTROPHILS 2.9 06/28/2019 08:21 AM    HGB 13.9 06/28/2019 08:21 AM    HCT 43.0 06/28/2019 08:21 AM    PLATELET 608 14/75/6241 08:21 AM    Creatinine 0.80 06/28/2019 08:21 AM    BUN 11 06/28/2019 08:21 AM    ALT (SGPT) 27 06/28/2019 08:21 AM    AST (SGOT) 27 06/28/2019 08:21 AM    Albumin 4.3 06/28/2019 08:21 AM     6. Malignant neoplasm of right female breast, unspecified estrogen receptor status, unspecified site of breast [facility])  Assessment & Plan:  Stable, based on history, physical exam and review of pertinent labs, studies and medications; meds reconciled; continue current treatment plan., This condition is managed by Specialist.  Key Oncology Meds     Patient is on no Oncologic meds.         Key Pain Meds     The patient is on no pain meds. Lab Results   Component Value Date/Time    WBC 4.9 06/28/2019 08:21 AM    ABS. NEUTROPHILS 2.9 06/28/2019 08:21 AM    HGB 13.9 06/28/2019 08:21 AM    HCT 43.0 06/28/2019 08:21 AM    PLATELET 234 15/88/6382 08:21 AM    Creatinine 0.80 06/28/2019 08:21 AM    BUN 11 06/28/2019 08:21 AM    ALT (SGPT) 27 06/28/2019 08:21 AM    AST (SGOT) 27 06/28/2019 08:21 AM    Albumin 4.3 06/28/2019 08:21 AM       7. Screening for hyperlipidemia  -     LIPID PANEL; Future    8. Screening for colon cancer  -     OCCULT BLOOD IMMUNOASSAY,DIAGNOSTIC; Future    Other orders  -     varicella-zoster recombinant, PF, (SHINGRIX, PF,) 50 mcg/0.5 mL susr injection; 0.5 mL by IntraMUSCular route once for 1 dose.  -     diph,pertuss,acel,,tetanus vac,PF, (ADACEL) 2 Lf-(2.5-5-3-5 mcg)-5Lf/0.5 mL syrg vaccine; 0.5 mL by IntraMUSCular route once for 1 dose. I have discussed the diagnosis with the patient and the intended treatment plan as seen in the above orders. The patient has received an after-visit summary and questions were answered concerning future plans. Asked to return should symptoms worsen or not improve with treatment. Any pending labs and studies will be relayed to patient when they become available. Pt verbalizes understanding of plan of care and denies further questions or concerns at this time. Follow-up and Dispositions    · Return in about 4 months (around 4/5/2020), or if symptoms worsen or fail to improve. Kelvin Fairbanks MD    Patient Instructions          Palpitations: Care Instructions  Your Care Instructions    Heart palpitations are the uncomfortable sensation that your heart is beating fast or irregularly. You might feel pounding or fluttering in your chest. It might feel like your heart is skipping a beat. Although palpitations may be caused by a heart problem, they also occur because of stress, fatigue, or use of alcohol, caffeine, or nicotine. Many medicines, including diet pills, antihistamines, decongestants, and some herbal products, can cause heart palpitations. Nearly everyone has palpitations from time to time. Depending on your symptoms, your doctor may need to do more tests to try to find the cause of your palpitations. Follow-up care is a key part of your treatment and safety. Be sure to make and go to all appointments, and call your doctor if you are having problems. It's also a good idea to know your test results and keep a list of the medicines you take. How can you care for yourself at home? · Avoid caffeine, nicotine, and excess alcohol. · Do not take illegal drugs, such as methamphetamines and cocaine. · Do not take weight loss or diet medicines unless you talk with your doctor first.  · Get plenty of sleep. · Do not overeat. · If you have palpitations again, take deep breaths and try to relax. This may slow a racing heart. · If you start to feel lightheaded, lie down to avoid injuries that might result if you pass out and fall down. · Keep a record of your palpitations and bring it to your next doctor's appointment. Write down:  ? The date and time. ? Your pulse. (If your heart is beating fast, it may be hard to count your pulse.)  ? What you were doing when the palpitations started. ? How long the palpitations lasted. ? Any other symptoms. · If an activity causes palpitations, slow down or stop. Talk to your doctor before you do that activity again. · Take your medicines exactly as prescribed. Call your doctor if you think you are having a problem with your medicine. When should you call for help? Call 911 anytime you think you may need emergency care. For example, call if:    · You passed out (lost consciousness).     · You have symptoms of a heart attack. These may include:  ? Chest pain or pressure, or a strange feeling in the chest.  ? Sweating. ? Shortness of breath.   ? Pain, pressure, or a strange feeling in the back, neck, jaw, or upper belly or in one or both shoulders or arms. ? Lightheadedness or sudden weakness. ? A fast or irregular heartbeat. After you call 911, the  may tell you to chew 1 adult-strength or 2 to 4 low-dose aspirin. Wait for an ambulance. Do not try to drive yourself.     · You have symptoms of a stroke. These may include:  ? Sudden numbness, tingling, weakness, or loss of movement in your face, arm, or leg, especially on only one side of your body. ? Sudden vision changes. ? Sudden trouble speaking. ? Sudden confusion or trouble understanding simple statements. ? Sudden problems with walking or balance. ? A sudden, severe headache that is different from past headaches.    Call your doctor now or seek immediate medical care if:    · You have heart palpitations and:  ? Are dizzy or lightheaded, or you feel like you may faint. ? Have new or increased shortness of breath.    Watch closely for changes in your health, and be sure to contact your doctor if:    · You continue to have heart palpitations. Where can you learn more? Go to http://rema-elias.info/. Enter R508 in the search box to learn more about \"Palpitations: Care Instructions. \"  Current as of: April 9, 2019  Content Version: 12.2  © 7904-8018 [employer]. Care instructions adapted under license by [employer] (which disclaims liability or warranty for this information). If you have questions about a medical condition or this instruction, always ask your healthcare professional. [place] [address] any warranty or liability for your use of this information.

## 2019-12-05 NOTE — PATIENT INSTRUCTIONS
Palpitations: Care Instructions Your Care Instructions Heart palpitations are the uncomfortable sensation that your heart is beating fast or irregularly. You might feel pounding or fluttering in your chest. It might feel like your heart is skipping a beat. Although palpitations may be caused by a heart problem, they also occur because of stress, fatigue, or use of alcohol, caffeine, or nicotine. Many medicines, including diet pills, antihistamines, decongestants, and some herbal products, can cause heart palpitations. Nearly everyone has palpitations from time to time. Depending on your symptoms, your doctor may need to do more tests to try to find the cause of your palpitations. Follow-up care is a key part of your treatment and safety. Be sure to make and go to all appointments, and call your doctor if you are having problems. It's also a good idea to know your test results and keep a list of the medicines you take. How can you care for yourself at home? · Avoid caffeine, nicotine, and excess alcohol. · Do not take illegal drugs, such as methamphetamines and cocaine. · Do not take weight loss or diet medicines unless you talk with your doctor first. 
· Get plenty of sleep. · Do not overeat. · If you have palpitations again, take deep breaths and try to relax. This may slow a racing heart. · If you start to feel lightheaded, lie down to avoid injuries that might result if you pass out and fall down. · Keep a record of your palpitations and bring it to your next doctor's appointment. Write down: ? The date and time. ? Your pulse. (If your heart is beating fast, it may be hard to count your pulse.) ? What you were doing when the palpitations started. ? How long the palpitations lasted. ? Any other symptoms. · If an activity causes palpitations, slow down or stop. Talk to your doctor before you do that activity again. · Take your medicines exactly as prescribed.  Call your doctor if you think you are having a problem with your medicine. When should you call for help? Call 911 anytime you think you may need emergency care. For example, call if: 
  · You passed out (lost consciousness).  
  · You have symptoms of a heart attack. These may include: 
? Chest pain or pressure, or a strange feeling in the chest. 
? Sweating. ? Shortness of breath. ? Pain, pressure, or a strange feeling in the back, neck, jaw, or upper belly or in one or both shoulders or arms. ? Lightheadedness or sudden weakness. ? A fast or irregular heartbeat. After you call 911, the  may tell you to chew 1 adult-strength or 2 to 4 low-dose aspirin. Wait for an ambulance. Do not try to drive yourself.  
  · You have symptoms of a stroke. These may include: 
? Sudden numbness, tingling, weakness, or loss of movement in your face, arm, or leg, especially on only one side of your body. ? Sudden vision changes. ? Sudden trouble speaking. ? Sudden confusion or trouble understanding simple statements. ? Sudden problems with walking or balance. ? A sudden, severe headache that is different from past headaches.  
 Call your doctor now or seek immediate medical care if: 
  · You have heart palpitations and: ? Are dizzy or lightheaded, or you feel like you may faint. ? Have new or increased shortness of breath.  
 Watch closely for changes in your health, and be sure to contact your doctor if: 
  · You continue to have heart palpitations. Where can you learn more? Go to http://rema-elias.info/. Enter R508 in the search box to learn more about \"Palpitations: Care Instructions. \" Current as of: April 9, 2019 Content Version: 12.2 © 2828-0020 [email or web address]. Care instructions adapted under license by [employer] (which disclaims liability or warranty for this information).  If you have questions about a medical condition or this instruction, always ask your healthcare professional. [place] [address] any warranty or liability for your use of this information.

## 2019-12-05 NOTE — ASSESSMENT & PLAN NOTE
This condition is managed by Specialist.  Key Oncology Meds     Patient is on no Oncologic meds. Key Pain Meds     The patient is on no pain meds. Lab Results   Component Value Date/Time    WBC 4.9 06/28/2019 08:21 AM    ABS.  NEUTROPHILS 2.9 06/28/2019 08:21 AM    HGB 13.9 06/28/2019 08:21 AM    HCT 43.0 06/28/2019 08:21 AM    PLATELET 413 12/24/9066 08:21 AM    Creatinine 0.80 06/28/2019 08:21 AM    BUN 11 06/28/2019 08:21 AM    ALT (SGPT) 27 06/28/2019 08:21 AM    AST (SGOT) 27 06/28/2019 08:21 AM    Albumin 4.3 06/28/2019 08:21 AM

## 2019-12-06 ENCOUNTER — HOSPITAL ENCOUNTER (OUTPATIENT)
Dept: LAB | Age: 59
Discharge: HOME | End: 2019-12-06

## 2019-12-06 DIAGNOSIS — E55.9 VITAMIN D DEFICIENCY: ICD-10-CM

## 2019-12-06 DIAGNOSIS — Z13.220 SCREENING FOR HYPERLIPIDEMIA: ICD-10-CM

## 2019-12-06 DIAGNOSIS — R79.89 ELEVATED TSH: ICD-10-CM

## 2019-12-06 DIAGNOSIS — R73.09 ELEVATED GLUCOSE: ICD-10-CM

## 2019-12-06 LAB
25(OH)D3 SERPL-MCNC: 25.2 NG/ML (ref 30–100)
CHOLEST SERPL-MCNC: 232 MG/DL
EST. AVERAGE GLUCOSE BLD GHB EST-MCNC: 108 MG/DL
HBA1C MFR BLD: 5.4 % (ref 4–5.6)
HDLC SERPL-MCNC: 51 MG/DL
HDLC SERPL: 4.5 {RATIO} (ref 0–5)
LDLC SERPL CALC-MCNC: 154.6 MG/DL (ref 0–100)
LIPID PROFILE,FLP: ABNORMAL
T4 SERPL-MCNC: 8.8 UG/DL (ref 4.8–13.9)
TRIGL SERPL-MCNC: 132 MG/DL (ref ?–150)
TSH SERPL DL<=0.05 MIU/L-ACNC: 3.82 UIU/ML (ref 0.36–3.74)
VLDLC SERPL CALC-MCNC: 26.4 MG/DL

## 2019-12-11 DIAGNOSIS — R79.89 ELEVATED TSH: Primary | ICD-10-CM

## 2019-12-11 RX ORDER — LEVOTHYROXINE SODIUM 50 UG/1
50 TABLET ORAL
Qty: 30 TAB | Refills: 3 | Status: SHIPPED | OUTPATIENT
Start: 2019-12-11 | End: 2020-03-04

## 2019-12-17 LAB
HEMOCCULT STL QL IA: NORMAL
REQUEST PROBLEM, 015255: NORMAL

## 2019-12-18 NOTE — PROGRESS NOTES
Results placed in mychart. Please let Elva Dunn know that we need a new kit as the one she had apparently was .

## 2019-12-23 LAB
HEMOCCULT STL QL IA: NORMAL
WRITTEN AUTHORIZATION, 977900: NORMAL

## 2019-12-30 RX ORDER — CARVEDILOL 3.12 MG/1
TABLET ORAL
Qty: 180 TAB | Refills: 1 | Status: SHIPPED | OUTPATIENT
Start: 2019-12-30

## 2020-03-04 DIAGNOSIS — R79.89 ELEVATED TSH: ICD-10-CM

## 2020-03-04 RX ORDER — LEVOTHYROXINE SODIUM 50 UG/1
TABLET ORAL
Qty: 90 TAB | Refills: 1 | Status: SHIPPED | OUTPATIENT
Start: 2020-03-04

## 2020-03-23 NOTE — TELEPHONE ENCOUNTER
COVID result received and reviewed. Patient called requesting his results, verified patient and . Results are negative. Patient verbalized understanding. Patient has questions regarding her heart monitor that was mailed to her. Please advise.      Phone: [phone]

## 2022-03-18 PROBLEM — C50.919 MALIGNANT NEOPLASM OF BREAST (HCC): Status: ACTIVE | Noted: 2019-07-02

## 2022-03-18 PROBLEM — E78.00 HYPERCHOLESTEREMIA: Status: ACTIVE | Noted: 2018-09-20

## 2022-03-18 PROBLEM — J30.89 PERENNIAL ALLERGIC RHINITIS: Status: ACTIVE | Noted: 2017-07-26

## 2022-03-18 PROBLEM — K21.9 GASTROESOPHAGEAL REFLUX DISEASE: Status: ACTIVE | Noted: 2017-11-07

## 2022-03-18 PROBLEM — I10 ESSENTIAL HYPERTENSION: Status: ACTIVE | Noted: 2018-09-20

## 2022-03-18 PROBLEM — E78.01 FAMILIAL HYPERCHOLESTEROLEMIA: Status: ACTIVE | Noted: 2017-08-31

## 2022-03-19 PROBLEM — E03.8 SUBCLINICAL HYPOTHYROIDISM: Status: ACTIVE | Noted: 2017-08-31

## 2022-03-19 PROBLEM — K31.89 DUODENAL MASS: Status: ACTIVE | Noted: 2017-05-15

## 2022-03-19 PROBLEM — Z80.8 FAMILY HISTORY OF SKIN CANCER: Status: ACTIVE | Noted: 2017-05-15

## 2022-03-20 PROBLEM — Z17.1 MALIGNANT NEOPLASM OF OVERLAPPING SITES OF RIGHT BREAST IN FEMALE, ESTROGEN RECEPTOR NEGATIVE (HCC): Status: ACTIVE | Noted: 2018-09-28

## 2022-03-20 PROBLEM — J30.81 ALLERGIC RHINITIS DUE TO ANIMAL HAIR AND DANDER: Status: ACTIVE | Noted: 2017-07-26

## 2022-03-20 PROBLEM — Z80.0 FAMILY HISTORY OF COLON CANCER: Status: ACTIVE | Noted: 2017-05-15

## 2022-03-20 PROBLEM — C50.811 MALIGNANT NEOPLASM OF OVERLAPPING SITES OF RIGHT BREAST IN FEMALE, ESTROGEN RECEPTOR NEGATIVE (HCC): Status: ACTIVE | Noted: 2018-09-28

## 2022-03-20 PROBLEM — R73.03 PREDIABETES: Status: ACTIVE | Noted: 2017-08-31

## 2022-07-27 ENCOUNTER — TRANSCRIBE ORDER (OUTPATIENT)
Dept: SCHEDULING | Age: 62
End: 2022-07-27

## 2022-07-27 DIAGNOSIS — Z82.49 FH: CEREBRAL ANEURYSM: ICD-10-CM

## 2022-07-27 DIAGNOSIS — R51.9 FREQUENT HEADACHES: Primary | ICD-10-CM

## 2022-07-27 DIAGNOSIS — Z82.3 FH: STROKE: ICD-10-CM

## 2022-09-23 ENCOUNTER — TRANSCRIBE ORDER (OUTPATIENT)
Dept: SCHEDULING | Age: 62
End: 2022-09-23

## 2022-12-16 ENCOUNTER — TRANSCRIBE ORDER (OUTPATIENT)
Dept: SCHEDULING | Age: 62
End: 2022-12-16

## 2022-12-16 DIAGNOSIS — Z82.49 FAMILY HISTORY OF ISCHEMIC HEART DISEASE: ICD-10-CM

## 2022-12-16 DIAGNOSIS — R51.9 HEAD ACHE: Primary | ICD-10-CM

## 2022-12-16 DIAGNOSIS — Z85.3 HX OF BREAST CANCER: ICD-10-CM

## 2022-12-16 DIAGNOSIS — Z85.3 PERSONAL HISTORY OF MALIGNANT NEOPLASM OF BREAST: ICD-10-CM

## 2022-12-16 DIAGNOSIS — R51.9 FREQUENT HEADACHES: Primary | ICD-10-CM

## 2023-04-22 DIAGNOSIS — Z82.49 FAMILY HISTORY OF ISCHEMIC HEART DISEASE: Primary | ICD-10-CM

## 2023-04-22 DIAGNOSIS — Z85.3 HX OF BREAST CANCER: ICD-10-CM

## 2023-04-23 DIAGNOSIS — Z82.49 FAMILY HISTORY OF ISCHEMIC HEART DISEASE: Primary | ICD-10-CM

## 2023-04-23 DIAGNOSIS — Z85.3 HX OF BREAST CANCER: ICD-10-CM

## 2023-04-24 DIAGNOSIS — Z85.3 PERSONAL HISTORY OF MALIGNANT NEOPLASM OF BREAST: ICD-10-CM

## 2023-04-24 DIAGNOSIS — Z82.49 FAMILY HISTORY OF ISCHEMIC HEART DISEASE: Primary | ICD-10-CM

## 2023-05-20 RX ORDER — LEVOTHYROXINE SODIUM 0.05 MG/1
1 TABLET ORAL
COMMUNITY
Start: 2020-03-04

## 2023-05-20 RX ORDER — MAGNESIUM OXIDE 400 MG/1
400 TABLET ORAL DAILY
COMMUNITY

## 2023-05-20 RX ORDER — FLUTICASONE PROPIONATE 50 MCG
1 SPRAY, SUSPENSION (ML) NASAL DAILY
COMMUNITY

## 2023-05-20 RX ORDER — CARVEDILOL 3.12 MG/1
1 TABLET ORAL 2 TIMES DAILY
COMMUNITY
Start: 2019-12-30

## 2023-05-20 RX ORDER — CALCIUM CARBONATE/VITAMIN D3 600 MG-10
TABLET ORAL
COMMUNITY

## 2023-05-20 RX ORDER — DIPHENHYDRAMINE HCL 25 MG
50 CAPSULE ORAL EVERY 6 HOURS PRN
COMMUNITY

## 2023-05-20 RX ORDER — SPIRONOLACTONE 25 MG/1
25 TABLET ORAL
COMMUNITY

## 2023-05-20 RX ORDER — ESTRADIOL 0.1 MG/G
2 CREAM VAGINAL DAILY
COMMUNITY

## 2023-05-20 RX ORDER — FAMOTIDINE 20 MG/1
20 TABLET, FILM COATED ORAL 2 TIMES DAILY
COMMUNITY

## 2023-05-25 ENCOUNTER — ANESTHESIA EVENT (OUTPATIENT)
Facility: HOSPITAL | Age: 63
End: 2023-05-25
Payer: COMMERCIAL

## 2023-05-25 ENCOUNTER — HOSPITAL ENCOUNTER (OUTPATIENT)
Facility: HOSPITAL | Age: 63
Setting detail: OUTPATIENT SURGERY
Discharge: HOME | End: 2023-05-25
Attending: INTERNAL MEDICINE | Admitting: INTERNAL MEDICINE
Payer: COMMERCIAL

## 2023-05-25 ENCOUNTER — ANESTHESIA (OUTPATIENT)
Facility: HOSPITAL | Age: 63
End: 2023-05-25
Payer: COMMERCIAL

## 2023-05-25 VITALS
OXYGEN SATURATION: 96 % | DIASTOLIC BLOOD PRESSURE: 87 MMHG | RESPIRATION RATE: 14 BRPM | HEIGHT: 68 IN | BODY MASS INDEX: 25.46 KG/M2 | SYSTOLIC BLOOD PRESSURE: 106 MMHG | WEIGHT: 168 LBS | TEMPERATURE: 98.2 F | HEART RATE: 92 BPM

## 2023-05-25 PROCEDURE — 3700000001 HC ADD 15 MINUTES (ANESTHESIA): Performed by: INTERNAL MEDICINE

## 2023-05-25 PROCEDURE — 6370000000 HC RX 637 (ALT 250 FOR IP): Performed by: INTERNAL MEDICINE

## 2023-05-25 PROCEDURE — 2709999900 HC NON-CHARGEABLE SUPPLY: Performed by: INTERNAL MEDICINE

## 2023-05-25 PROCEDURE — 3600007512: Performed by: INTERNAL MEDICINE

## 2023-05-25 PROCEDURE — 6360000002 HC RX W HCPCS: Performed by: NURSE ANESTHETIST, CERTIFIED REGISTERED

## 2023-05-25 PROCEDURE — 3700000000 HC ANESTHESIA ATTENDED CARE: Performed by: INTERNAL MEDICINE

## 2023-05-25 PROCEDURE — 88305 TISSUE EXAM BY PATHOLOGIST: CPT

## 2023-05-25 PROCEDURE — 3600007502: Performed by: INTERNAL MEDICINE

## 2023-05-25 PROCEDURE — 2580000003 HC RX 258: Performed by: NURSE ANESTHETIST, CERTIFIED REGISTERED

## 2023-05-25 PROCEDURE — 7100000011 HC PHASE II RECOVERY - ADDTL 15 MIN: Performed by: INTERNAL MEDICINE

## 2023-05-25 PROCEDURE — 7100000010 HC PHASE II RECOVERY - FIRST 15 MIN: Performed by: INTERNAL MEDICINE

## 2023-05-25 PROCEDURE — 2500000003 HC RX 250 WO HCPCS: Performed by: NURSE ANESTHETIST, CERTIFIED REGISTERED

## 2023-05-25 RX ORDER — SODIUM CHLORIDE 9 MG/ML
INJECTION, SOLUTION INTRAVENOUS CONTINUOUS PRN
Status: DISCONTINUED | OUTPATIENT
Start: 2023-05-25 | End: 2023-05-25 | Stop reason: SDUPTHER

## 2023-05-25 RX ORDER — SIMETHICONE 20 MG/.3ML
EMULSION ORAL PRN
Status: DISCONTINUED | OUTPATIENT
Start: 2023-05-25 | End: 2023-05-25 | Stop reason: ALTCHOICE

## 2023-05-25 RX ORDER — LIDOCAINE HYDROCHLORIDE 20 MG/ML
INJECTION, SOLUTION EPIDURAL; INFILTRATION; INTRACAUDAL; PERINEURAL PRN
Status: DISCONTINUED | OUTPATIENT
Start: 2023-05-25 | End: 2023-05-25 | Stop reason: SDUPTHER

## 2023-05-25 RX ORDER — SODIUM CHLORIDE 0.9 % (FLUSH) 0.9 %
5-40 SYRINGE (ML) INJECTION PRN
Status: DISCONTINUED | OUTPATIENT
Start: 2023-05-25 | End: 2023-05-25 | Stop reason: HOSPADM

## 2023-05-25 RX ORDER — VITAMIN B COMPLEX
1 CAPSULE ORAL DAILY
COMMUNITY

## 2023-05-25 RX ORDER — SODIUM CHLORIDE 0.9 % (FLUSH) 0.9 %
5-40 SYRINGE (ML) INJECTION EVERY 12 HOURS SCHEDULED
Status: DISCONTINUED | OUTPATIENT
Start: 2023-05-25 | End: 2023-05-25 | Stop reason: HOSPADM

## 2023-05-25 RX ORDER — ASCORBIC ACID 500 MG
500 TABLET ORAL DAILY
COMMUNITY

## 2023-05-25 RX ORDER — FEXOFENADINE HCL 180 MG/1
180 TABLET ORAL DAILY
COMMUNITY

## 2023-05-25 RX ORDER — SODIUM CHLORIDE 9 MG/ML
25 INJECTION, SOLUTION INTRAVENOUS PRN
Status: DISCONTINUED | OUTPATIENT
Start: 2023-05-25 | End: 2023-05-25 | Stop reason: HOSPADM

## 2023-05-25 RX ORDER — SODIUM CHLORIDE 9 MG/ML
INJECTION, SOLUTION INTRAVENOUS CONTINUOUS
Status: DISCONTINUED | OUTPATIENT
Start: 2023-05-25 | End: 2023-05-25 | Stop reason: HOSPADM

## 2023-05-25 RX ADMIN — PROPOFOL 100 MG: 10 INJECTION, EMULSION INTRAVENOUS at 09:05

## 2023-05-25 RX ADMIN — SODIUM CHLORIDE: 9 INJECTION, SOLUTION INTRAVENOUS at 08:56

## 2023-05-25 RX ADMIN — PROPOFOL 25 MG: 10 INJECTION, EMULSION INTRAVENOUS at 09:14

## 2023-05-25 RX ADMIN — PROPOFOL 50 MG: 10 INJECTION, EMULSION INTRAVENOUS at 09:31

## 2023-05-25 RX ADMIN — LIDOCAINE HYDROCHLORIDE 50 MG: 20 INJECTION, SOLUTION EPIDURAL; INFILTRATION; INTRACAUDAL; PERINEURAL at 09:05

## 2023-05-25 RX ADMIN — PROPOFOL 25 MG: 10 INJECTION, EMULSION INTRAVENOUS at 09:27

## 2023-05-25 RX ADMIN — PROPOFOL 50 MG: 10 INJECTION, EMULSION INTRAVENOUS at 09:24

## 2023-05-25 RX ADMIN — PROPOFOL 25 MG: 10 INJECTION, EMULSION INTRAVENOUS at 09:17

## 2023-05-25 RX ADMIN — PROPOFOL 75 MG: 10 INJECTION, EMULSION INTRAVENOUS at 09:18

## 2023-05-25 RX ADMIN — PROPOFOL 50 MG: 10 INJECTION, EMULSION INTRAVENOUS at 09:08

## 2023-05-25 ASSESSMENT — PAIN - FUNCTIONAL ASSESSMENT: PAIN_FUNCTIONAL_ASSESSMENT: NONE - DENIES PAIN

## 2023-05-25 NOTE — DISCHARGE INSTRUCTIONS
118 [facility].  217 [facility] [address], [address]  [address] [facility]  [id number]  1960    It was my pleasure seeing you for your procedure. You will also receive a summary report with the findings from this procedure and any further recommendations. If you had polyps removed or biopsies taken during your procedure, you will receive a separate letter from me within approximately the next 2 weeks. If you don't receive this letter or if you have any questions, please call my office [phone]. Please take note of the post procedure instructions listed below. Dr. Rafael Becerra Spore    These instructions give you information on caring for yourself after your procedure. Call your doctor if you have any problems or questions after your procedure. HOME CARE  Walk if you have belly cramping or gas. Walking will help get rid of the air and reduce the bloated feeling in your belly (abdomen). Your IV site (where you received drugs) may be tender to touch. Place warm towels on the site; keep your arm up on two pillows if you have any swelling or soreness in the area. You may shower. ACTIVITY:  Take frequent rest periods and move at a slower pace for the next 24 hours. .  You may resume your regular activity tomorrow if you are feeling back to normal.  Do not drive or ride a bicycle for at least 24 hours (because of the medicine (anesthesia) used during the test). Do not sign any important legal documents or use or operate any machinery for 24 hours  Do not take sleeping medicines/nerve drugs for 24 hours unless the doctor tells you. You can return to work/school tomorrow unless otherwise instructed. NUTRITION:  Drink plenty of fluids to keep your pee (urine) clear or pale yellow  Begin with a light meal and progress to your normal diet.  Heavy or fried foods are harder to digest and may make

## 2023-05-25 NOTE — ANESTHESIA PRE PROCEDURE
Department of Anesthesiology  Preprocedure Note       Name:  Vincenzo Lange   Age:  58 y.o.  :  1960                                          MRN:  [id number]         Date:  2023      Surgeon: Evangelina Rios):  Wendy Galvan MD    Procedure: Procedure(s):  EGD ESOPHAGOGASTRODUODENOSCOPY  COLONOSCOPY DIAGNOSTIC    Medications prior to admission:   Prior to Admission medications    Medication Sig Start Date End Date Taking? Authorizing Provider   vitamin C (ASCORBIC ACID) 500 MG tablet Take 1 tablet by mouth daily   Yes Historical Provider, MD   b complex vitamins capsule Take 1 capsule by mouth daily   Yes Historical Provider, MD   fexofenadine (ALLEGRA ALLERGY) 180 MG tablet Take 1 tablet by mouth daily   Yes Historical Provider, MD   calcium carb-cholecalciferol 600-10 MG-MCG TABS per tab Take by mouth    Ar Automatic Reconciliation   carvedilol (COREG) 3.125 MG tablet Take 1 tablet by mouth 2 times daily 19   Ar Automatic Reconciliation   vitamin D (CHOLECALCIFEROL) 25 MCG (1000 UT) TABS tablet Take by mouth daily    Ar Automatic Reconciliation   diphenhydrAMINE (BENADRYL) 25 MG capsule Take 2 capsules by mouth every 6 hours as needed    Ar Automatic Reconciliation   estradiol (ESTRACE) 0.1 MG/GM vaginal cream Place 2 g vaginally daily    Ar Automatic Reconciliation   famotidine (PEPCID) 20 MG tablet Take 1 tablet by mouth 2 times daily    Ar Automatic Reconciliation   fluticasone (FLONASE) 50 MCG/ACT nasal spray 1 spray by Nasal route daily    Ar Automatic Reconciliation   levothyroxine (SYNTHROID) 50 MCG tablet Take 1 tablet by mouth every morning (before breakfast) 3/4/20   Ar Automatic Reconciliation   magnesium oxide (MAG-OX) 400 MG tablet Take 1 tablet by mouth daily    Ar Automatic Reconciliation   spironolactone (ALDACTONE) 25 MG tablet Take 1 tablet by mouth    Ar Automatic Reconciliation       Current medications:    No current facility-administered medications for this encounter.

## 2023-05-25 NOTE — H&P
118 [facility].  [address] [place] [address] [address], [address]  [phone]                                History and Physical     NAME: Danae Murguia   :  1960   MRN:  [id number]     HPI:  The patient was seen and examined. Past Surgical History:   Procedure Laterality Date    APPENDECTOMY      BREAST SURGERY      ORTHOPEDIC SURGERY       History reviewed. No pertinent past medical history. Social History     Tobacco Use    Smoking status: Former    Smokeless tobacco: Never   Substance Use Topics    Alcohol use: Yes     Alcohol/week: 4.0 standard drinks    Drug use: Not Currently     Types: Marijuana Princella Fergusson)     Allergies   Allergen Reactions    Latex Other (See Comments)     Rash    Adhesive Tape Rash    Dust Mite Extract Rash     Family History   Problem Relation Age of Onset    Hypertension Mother     Diabetes Mother     Cancer Mother     Stroke Mother     Elevated Lipids Mother     Cancer Father     Stroke Father     Heart Disease Father      No current facility-administered medications for this encounter. PHYSICAL EXAM:  General: WD, WN. Alert, cooperative, no acute distress    HEENT: NC, Atraumatic. PERRLA, EOMI. Anicteric sclerae. Lungs:  CTA Bilaterally. No Wheezing/Rhonchi/Rales. Heart:  Regular  rhythm,  No murmur, No Rubs, No Gallops  Abdomen: Soft, Non distended, Non tender. +Bowel sounds, no HSM  Extremities: No c/c/e  Neurologic:  CN 2-12 gi, Alert and oriented X 3. No acute neurological distress   Psych:   Good insight. Not anxious nor agitated. The heart, lungs and mental status were satisfactory for the administration of mac sedation and for the procedure. Mallampati score: 2     The patient was counseled at length about the risks of radha Covid-19 in the michele-operative and post-operative states including the recovery window of their procedure.   The patient was made aware that radha Covid-19 after a surgical procedure may worsen their

## 2023-05-25 NOTE — ANESTHESIA POSTPROCEDURE EVALUATION
Department of Anesthesiology  Postprocedure Note    Patient: Stacey Wren  MRN: [id number]  YOB: 1960  Date of evaluation: 5/25/2023      Procedure Summary     Date: 05/25/23 Room / Location: [facility] ENDO  / [facility] ENDOSCOPY    Anesthesia Start: 1483 Anesthesia Stop: 8974    Procedures:       EGD ESOPHAGOGASTRODUODENOSCOPY (Upper GI Region)      COLONOSCOPY DIAGNOSTIC (Lower GI Region)      EGD BIOPSY (Upper GI Region) Diagnosis:       Peptic ulcer disease      Family history of colon cancer      (Peptic ulcer disease [K27.9])      (Family history of colon cancer [Z80.0])    Surgeons: Chrystal Borrero MD Responsible Provider: Leroy Lobato MD    Anesthesia Type: MAC ASA Status: 2          Anesthesia Type: No value filed.     Magalis Phase I: Magalis Score: 10    Magalis Phase II: Magalis Score: 10      Anesthesia Post Evaluation    Patient location during evaluation: PACU  Patient participation: complete - patient participated  Level of consciousness: awake  Pain score: 0  Airway patency: patent  Nausea & Vomiting: no nausea  Complications: no  Cardiovascular status: blood pressure returned to baseline and hemodynamically stable  Respiratory status: acceptable  Hydration status: stable

## 2023-05-25 NOTE — OP NOTE
118 [facility].  [address] [place] [address] [address], [address]   [phone]                           Colonoscopy and EGD Procedure Note      Indications:   Family history colon cancer, history peptic ulcer disease      :  Xi Talley MD    Staff: Circulator: Aubrey Thurman RN  Endoscopy Technician: Eden Travis    Referring Provider: Concepción Alcala MD    Sedation:  MAC anesthesia    Procedure Details:  After informed consent was obtained with all risks and benefits of procedure explained and pre-operative exam completed, pt was placed in the left lateral decubitus position. Following sequential administration of sedation as per above, the gastroscope was inserted into the mouth and advanced under direct vision to second portion of the duodenum. A careful inspection was made as the gastroscope was withdrawn, including a retroflexed view of the proximal stomach; findings and interventions are described below. EGD Findings:  Esophagus:normal  Stomach:antral predominant gastritis, biopsies obtained from antrum and body   Duodenum/jejunum:normal    EGD Interventions:  biopsies     The bed was then turned and upon sequential sedation as per above, a digital rectal exam was performed per below. The Olympus videocolonoscope was inserted in the rectum and carefully advanced to the terminal ileum. The quality of preparation was good. [place] Bowel Prep Score  3/3/3. The colonoscope was slowly withdrawn with careful evaluation between folds. Retroflexion in the rectum was performed.      Colon Findings:   Rectum: small internal hemorrhoids  Sigmoid: mild diverticulosis   Descending Colon: mild diverticulosis   Transverse Colon: mild diverticulosis   Ascending Colon: normal  Cecum: normal  Terminal Ileum: normal    Colonoscopy Interventions:  none           Specimens Removed:    ID Type Source Tests Collected by Time Destination   A : Antrum biopsy-gastric Tissue Gastric SURGICAL

## 2023-05-25 NOTE — PERIOP NOTE

## (undated) DEVICE — TUBING IRRIG COMPATIBLE W ERBE MEDIVATOR PMP HYDR

## (undated) DEVICE — FORCEPS BX L240CM JAW DIA2.8MM L CAP W/ NDL MIC MESH TOOTH